# Patient Record
Sex: MALE | Race: BLACK OR AFRICAN AMERICAN | URBAN - METROPOLITAN AREA
[De-identification: names, ages, dates, MRNs, and addresses within clinical notes are randomized per-mention and may not be internally consistent; named-entity substitution may affect disease eponyms.]

---

## 2021-06-11 ENCOUNTER — OFFICE VISIT (OUTPATIENT)
Dept: URBAN - METROPOLITAN AREA CLINIC 82 | Facility: CLINIC | Age: 61
End: 2021-06-11
Payer: COMMERCIAL

## 2021-06-11 ENCOUNTER — DASHBOARD ENCOUNTERS (OUTPATIENT)
Age: 61
End: 2021-06-11

## 2021-06-11 ENCOUNTER — WEB ENCOUNTER (OUTPATIENT)
Dept: URBAN - METROPOLITAN AREA CLINIC 82 | Facility: CLINIC | Age: 61
End: 2021-06-11

## 2021-06-11 VITALS
SYSTOLIC BLOOD PRESSURE: 145 MMHG | BODY MASS INDEX: 44.1 KG/M2 | TEMPERATURE: 97.5 F | HEART RATE: 72 BPM | HEIGHT: 71 IN | WEIGHT: 315 LBS | DIASTOLIC BLOOD PRESSURE: 90 MMHG

## 2021-06-11 DIAGNOSIS — R10.9 ABDOMINAL PAIN: ICD-10-CM

## 2021-06-11 DIAGNOSIS — K63.89 MESENTERIC MASS: ICD-10-CM

## 2021-06-11 DIAGNOSIS — K59.01 CONSTIPATION: ICD-10-CM

## 2021-06-11 PROBLEM — 21522001 ABDOMINAL PAIN: Status: ACTIVE | Noted: 2021-06-11

## 2021-06-11 PROBLEM — 14760008 CONSTIPATION: Status: ACTIVE | Noted: 2021-06-11

## 2021-06-11 PROCEDURE — G8417 CALC BMI ABV UP PARAM F/U: HCPCS | Performed by: INTERNAL MEDICINE

## 2021-06-11 PROCEDURE — G9903 PT SCRN TBCO ID AS NON USER: HCPCS | Performed by: INTERNAL MEDICINE

## 2021-06-11 PROCEDURE — G8427 DOCREV CUR MEDS BY ELIG CLIN: HCPCS | Performed by: INTERNAL MEDICINE

## 2021-06-11 PROCEDURE — 99204 OFFICE O/P NEW MOD 45 MIN: CPT | Performed by: INTERNAL MEDICINE

## 2021-06-11 RX ORDER — ATORVASTATIN CALCIUM 40 MG/1
1 TABLET TABLET, FILM COATED ORAL ONCE A DAY
Status: ACTIVE | COMMUNITY

## 2021-06-11 RX ORDER — CARVEDILOL 25 MG/1
1 TABLET WITH FOOD TABLET, FILM COATED ORAL TWICE A DAY
Status: ACTIVE | COMMUNITY

## 2021-06-11 RX ORDER — LEVOTHYROXINE SODIUM 50 UG/1
1 CAPSULE IN THE MORNING ON AN EMPTY STOMACH CAPSULE ORAL ONCE A DAY
Status: ACTIVE | COMMUNITY

## 2021-06-11 RX ORDER — SPIRONOLACTONE 25 MG/1
1 TABLET TABLET, FILM COATED ORAL
Status: ACTIVE | COMMUNITY

## 2021-06-11 RX ORDER — FOLIC ACID 1 MG/1
1 TABLET TABLET ORAL ONCE A DAY
Status: ACTIVE | COMMUNITY

## 2021-06-11 RX ORDER — ALPRAZOLAM 0.25 MG/1
1 TABLET TABLET ORAL TWICE A DAY
Status: ACTIVE | COMMUNITY

## 2021-06-11 RX ORDER — BUMETANIDE 2 MG/1
AS DIRECTED TABLET ORAL
Status: ACTIVE | COMMUNITY

## 2021-06-11 NOTE — HPI-TODAY'S VISIT:
06/11/2021 Patient is a 60 year old  male who presents for a hospital follow up visit. He was seen at [facility] ER on 06/10/2021 for abdominal pain, nausea, vomiting, constipation, and decreased urinary output. CT scan showed 3.7 x 2.9cm mesenteric mass, could be lymph node vs GIST tumor. Labs also showed patient is anemic.  Patient says he has been seen twice in the ER for severe epigastric pain. He had CT scan which did not show any blockage. Patient says his last bowel movement was Saturday (6 days ago). He took two suppositories (including magnesium citrate) without improvement. Patient takes levothyroxine for his thyroid condition. Previous colonoscopy about 2 years ago showed two polyps. Patient has a history of carcinoid cancer causing bowel obstruction, and he underwent surgery in 2012 at [facility].

## 2021-06-11 NOTE — PHYSICAL EXAM GASTROINTESTINAL
Abdomen , soft, nontender, distended , no guarding or rigidity , no masses palpable , normal bowel sounds , Liver and Spleen , no hepatomegaly present , no hepatosplenomegaly , liver nontender , spleen not palpable